# Patient Record
Sex: FEMALE | Race: WHITE
[De-identification: names, ages, dates, MRNs, and addresses within clinical notes are randomized per-mention and may not be internally consistent; named-entity substitution may affect disease eponyms.]

---

## 2018-06-12 ENCOUNTER — HOSPITAL ENCOUNTER (EMERGENCY)
Age: 73
Discharge: HOME | End: 2018-06-12
Payer: MEDICARE

## 2018-06-12 VITALS
SYSTOLIC BLOOD PRESSURE: 125 MMHG | OXYGEN SATURATION: 98 % | RESPIRATION RATE: 12 BRPM | HEART RATE: 59 BPM | DIASTOLIC BLOOD PRESSURE: 75 MMHG

## 2018-06-12 VITALS
RESPIRATION RATE: 20 BRPM | OXYGEN SATURATION: 100 % | SYSTOLIC BLOOD PRESSURE: 153 MMHG | TEMPERATURE: 97.7 F | DIASTOLIC BLOOD PRESSURE: 78 MMHG | HEART RATE: 68 BPM

## 2018-06-12 VITALS
SYSTOLIC BLOOD PRESSURE: 131 MMHG | HEART RATE: 65 BPM | DIASTOLIC BLOOD PRESSURE: 76 MMHG | RESPIRATION RATE: 13 BRPM | OXYGEN SATURATION: 95 %

## 2018-06-12 VITALS
SYSTOLIC BLOOD PRESSURE: 131 MMHG | DIASTOLIC BLOOD PRESSURE: 78 MMHG | HEART RATE: 64 BPM | RESPIRATION RATE: 15 BRPM | OXYGEN SATURATION: 100 %

## 2018-06-12 DIAGNOSIS — R07.89: Primary | ICD-10-CM

## 2018-06-12 LAB
ADD MANUAL DIFF / SLIDE REVIEW: NO
ALBUMIN SERPL-MCNC: 4.2 G/DL (ref 3.5–5)
ALBUMIN/GLOB SERPL: 1.5 {RATIO} (ref 1–2.8)
ALP SERPL-CCNC: 87 U/L (ref 38–126)
ALT SERPL-CCNC: 42 IU/L (ref 9–52)
BUN SERPL-MCNC: 17 MG/DL (ref 7–17)
CALCIUM SERPL-MCNC: 9.1 MG/DL (ref 8.4–10.2)
CHLORIDE SERPL-SCNC: 99 MMOL/L (ref 98–107)
CK SERPL-CCNC: 145 U/L (ref 30–135)
CKMB % RELATIVE INDEX: 2.1 % (ref 1.5–5)
CO2 SERPL-SCNC: 28 MMOL/L (ref 22–32)
ESTIMATED GLOMERULAR FILT RATE: > 60 ML/MIN (ref 60–?)
GLOBULIN SER CALC-MCNC: 2.8 G/DL (ref 1.7–4.1)
GLUCOSE SERPL-MCNC: 99 MG/DL (ref 80–110)
HEMATOCRIT: 38.4 % (ref 36–46)
HEMOGLOBIN: 12.9 G/DL (ref 12–16)
HEMOLYSIS: < 15 (ref 0–50)
LIPASE SERPL-CCNC: 156 U/L (ref 23–300)
MAGNESIUM SERPL-MCNC: 2 MG/DL (ref 1.6–2.3)
MCV RBC: 83.9 FL (ref 80–100)
MEAN CORPUSCULAR HEMOGLOBIN: 28.1 PG (ref 26–34)
MEAN CORPUSCULAR HGB CONC: 33.5 % (ref 30–36)
PLATELET COUNT: 268 X10^3/UL (ref 150–400)
POTASSIUM SERPL-SCNC: 4.3 MMOL/L (ref 3.4–5.1)
PROT SERPL-MCNC: 7 G/DL (ref 6.3–8.2)
SODIUM SERPL-SCNC: 135 MMOL/L (ref 137–145)
TROPONIN I SERPL-MCNC: < 0.012 NG/ML (ref 0.01–0.03)

## 2018-06-12 PROCEDURE — 72040 X-RAY EXAM NECK SPINE 2-3 VW: CPT

## 2018-06-12 PROCEDURE — 84484 ASSAY OF TROPONIN QUANT: CPT

## 2018-06-12 PROCEDURE — 99283 EMERGENCY DEPT VISIT LOW MDM: CPT

## 2018-06-12 PROCEDURE — 82553 CREATINE MB FRACTION: CPT

## 2018-06-12 PROCEDURE — 71046 X-RAY EXAM CHEST 2 VIEWS: CPT

## 2018-06-12 PROCEDURE — 82550 ASSAY OF CK (CPK): CPT

## 2018-06-12 PROCEDURE — 96360 HYDRATION IV INFUSION INIT: CPT

## 2018-06-12 PROCEDURE — 36591 DRAW BLOOD OFF VENOUS DEVICE: CPT

## 2018-06-12 PROCEDURE — 99285 EMERGENCY DEPT VISIT HI MDM: CPT

## 2018-06-12 PROCEDURE — 93005 ELECTROCARDIOGRAM TRACING: CPT

## 2018-06-12 PROCEDURE — 80053 COMPREHEN METABOLIC PANEL: CPT

## 2018-06-12 PROCEDURE — 85025 COMPLETE CBC W/AUTO DIFF WBC: CPT

## 2018-06-12 PROCEDURE — 83735 ASSAY OF MAGNESIUM: CPT

## 2018-06-12 PROCEDURE — 83690 ASSAY OF LIPASE: CPT
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
## 2018-07-06 ENCOUNTER — HOSPITAL ENCOUNTER (OUTPATIENT)
Age: 73
End: 2018-07-06
Payer: MEDICARE

## 2018-07-06 DIAGNOSIS — E78.00: Primary | ICD-10-CM

## 2018-07-06 LAB
ALT SERPL-CCNC: 39 IU/L (ref 9–52)
CHOLEST SERPL-MCNC: 194 MG/DL (ref 140–199)
HDLC SERPL-MCNC: 88 MG/DL (ref 40–60)
TRIGL SERPL-MCNC: 68 MG/DL (ref 35–150)

## 2018-07-06 PROCEDURE — 36415 COLL VENOUS BLD VENIPUNCTURE: CPT

## 2018-07-06 PROCEDURE — 80061 LIPID PANEL: CPT

## 2018-07-06 PROCEDURE — 84460 ALANINE AMINO (ALT) (SGPT): CPT

## 2018-07-06 PROCEDURE — 84450 TRANSFERASE (AST) (SGOT): CPT

## 2018-10-19 ENCOUNTER — HOSPITAL ENCOUNTER (OUTPATIENT)
Age: 73
End: 2018-10-19
Payer: MEDICARE

## 2018-10-19 DIAGNOSIS — R07.9: Primary | ICD-10-CM

## 2018-10-19 PROCEDURE — 93306 TTE W/DOPPLER COMPLETE: CPT

## 2018-10-19 NOTE — DI.ECHO.S_ITS
"  
Echocardiogram Report  
+-----------------------------------------------------------------------------+  
:Name: BONY JUARES Study Date: 10/19/2018 Height: 54 in :  
:[facility] MRN #: [id number] Weight: 128 lb :  
:Account #: [id number] Gender: Female BSA: 1.4 m2 :  
:: 1945 Age: 73 yrs BP: 132/70 mmHg:  
:Reason For Study: Chest pain :  
: Performed By: Padmini Gant :  
:Referring: ROSY GAGNON :  
+-----------------------------------------------------------------------------+  
Interpretation Summary  
The left ventricle is normal in size, wall thickness, and systolic function  
without any focal wall motion abnormalities.  
The ejection fraction is estimated to be 60-65%.  
The right ventricle grossly appears normal in size with probable normal  
systolic function.  
No significant valvular abnormalities.  
There is no prior echocardiogram for comparison.  
  
Procedure: A two-dimensional transthoracic echocardiogram with color flow  
and Doppler was performed. The study quality was technically adequate. There  
is no prior echocardiogram noted for this patient. The patient was in normal  
sinus rhythm during the exam.  
  
Left Ventricle: The left ventricle is normal in size, wall thickness, and  
systolic function without any focal wall motion abnormalities. The ejection  
fraction is estimated to be 60-65%. Left ventricular wall motion is normal.  
Diastolic parameters suggest probable normal left ventricular diastolic  
function and normal filling pressures.  
  
Right Ventricle: The right ventricle grossly appears normal in size with  
probable normal systolic function.  
  
Atria: The left atrial size is normal. Right atrial size is normal. The  
interatrial septum is intact with no evidence for an atrial septal defect.  
  
Mitral Valve: The mitral valve is grossly normal. There is no mitral valve  
stenosis. There is mild mitral regurgitation.  
  
Aortic Valve: The aortic valve opens well. The aortic valve is trileaflet.  
There is no aortic valve stenosis. No aortic regurgitation is present.  
  
Tricuspid Valve: The tricuspid valve is normal in structure and function.  
There is trace tricuspid regurgitation. Pulmonary artery pressures cannot be  
estimated because of the lack of a measurable TR jet velocity but the IVC  
suggests a CVP of around 3 mmHg.  
  
Pulmonic Valve: The pulmonic valve is not well visualized.  
  
Great Vessels: The aortic root is normal size. The dimensions of the  
ascending aorta are normal. The IVC is of normal diameter and collapses  
greater than 50% with a sniff. This suggests a low right atrial pressure of 3  
mm Hg.  
  
Pericardium/ Pleura There is no pericardial effusion. There is no pleural  
effusion.  
  
MMode/2D Measurements & Calculations  
LVIDd: 4.1 cm Ao root diam: 3.1 cm  
LVIDs: 2.9 cm Aortic Jxn: 2.7 cm  
FS: 29.7 % asc Aorta Diam: 3.2 cm  
EPSS: 0.00 cm Ao Arch Diam (Prox Trans): 2.5 cm  
IVSd: 0.71 cm  
LVPWd: 0.64 cm  
LV ji. diameter/BSA (cm/m^2): 2.9  
LV sys. diameter/BSA (cm/m^2): 2.0  
_______________________________________________________________  
LA dimension: 3.5 cm RA long axis: 4.5 cm  
LA A2 area: 19.5 cm2 RA area: 15.1 cm2  
LA A4 area: 18.4 cm2 RA vol: 42.8 ml  
LA length (vol): 5.0 cm RA VI: 29.9 ml/m2  
LA vol: 60.7 ml IVC diam: 1.9 cm  
LA vol index: 42.4 ml/m2 RVDd major: 4.3 cm  
_______________________________________________________________  
RVD1 (basal): 3.4 cm  
RVD2 (mid): 2.7 cm  
Doppler Measurements & Calculations  
Ao V2 max: 110.2 cm/sec MV E max tremaine: 98.7 cm/sec  
Ao V2 mean: 68.6 cm/sec MV A max tremaine: 65.2 cm/sec  
Ao max P.9 mmHg MV E/A: 1.5  
Ao mean P.3 mmHg Med Peak E' Tremaine: 8.6 cm/sec  
Ao V2 VTI: 25.9 cm E/E' med: 11.4  
Lat Peak E' Tremaine: 11.2 cm/sec  
E/E' lat: 8.8  
E/e' average: 10.1  
MV dec time: 0.17 sec  
MV P1/2t: 49.8 msec  
_______________________________________________________________  
TR max tremaine: 
[id number]|2018-10-15 13:56:00|2018-10-15 14:25:00|.S_ITS|[facility]|Imaging|[id number]|"PROCEDURE:  US OB FOLLOW UP

## 2018-10-20 ENCOUNTER — HOSPITAL ENCOUNTER (OUTPATIENT)
Age: 73
End: 2018-10-20
Payer: MEDICARE

## 2018-10-20 DIAGNOSIS — Z12.31: Primary | ICD-10-CM

## 2018-10-20 PROCEDURE — 77067 SCR MAMMO BI INCL CAD: CPT

## 2018-10-20 PROCEDURE — 77063 BREAST TOMOSYNTHESIS BI: CPT

## 2018-10-20 NOTE — DI.MG.S_ITS
BILATERAL DIGITAL SCREENING MAMMOGRAM 3D/2D WITH CAD: 10/20/2018  
   
CLINICAL: Routine screening.    
   
Comparison is made to exams dated:  10/12/2016 mammogram, 7/2/2015 mammogram, and   
6/30/2014 mammogram - [facility].  The tissue of both breasts is heterogeneously dense. This   
may lower the sensitivity of mammography.    
   
Current study was also evaluated with a Computer Aided Detection (CAD) system.    
No significant masses, calcifications, or other findings are seen in either breast.    
There has been no significant interval change.  
   
IMPRESSION: NEGATIVE  
There is no mammographic evidence of malignancy. A 1 year screening mammogram is   
recommended.     
   
This exam was interpreted at Station ID: [id number].    
   
NOTE: For mammograms, a report in lay terms will be sent to the patient. Approximately   
15% of breast malignancies will not be visualized mammographically. In the management of   
a palpable breast mass, a negative mammogram must not discourage biopsy of a clinically   
suspicious lesion.  
   
Electronically Signed By: La rosas/mik:10/23/2018 10:16:30    
   
   
letter sent: Normal Exam    
ACR BI-RADS Category 1: Negative 3341F

## 2019-07-08 ENCOUNTER — HOSPITAL ENCOUNTER (OUTPATIENT)
Age: 74
End: 2019-07-08
Payer: MEDICARE

## 2019-07-08 DIAGNOSIS — Z82.62: ICD-10-CM

## 2019-07-08 DIAGNOSIS — M85.88: Primary | ICD-10-CM

## 2019-07-08 PROCEDURE — 77080 DXA BONE DENSITY AXIAL: CPT

## 2019-10-24 ENCOUNTER — HOSPITAL ENCOUNTER (OUTPATIENT)
Age: 74
End: 2019-10-24
Payer: MEDICARE

## 2019-10-24 DIAGNOSIS — Z12.31: Primary | ICD-10-CM

## 2019-10-24 PROCEDURE — 77063 BREAST TOMOSYNTHESIS BI: CPT

## 2019-10-24 PROCEDURE — 77067 SCR MAMMO BI INCL CAD: CPT

## 2019-10-24 NOTE — DI.MG.S_ITS
BILATERAL DIGITAL SCREENING MAMMOGRAM 3D/2D WITH CAD: 10/24/2019  
   
CLINICAL: Routine screening.    
   
Comparison is made to exams dated:  10/20/2018 mammogram - [facility], 10/12/2016   
mammogram, and 7/2/2015 mammogram - [facility].  The tissue of both breasts is   
heterogeneously dense. This may lower the sensitivity of mammography.    
   
Current study was also evaluated with a Computer Aided Detection (CAD) system.    
No significant masses, calcifications, or other findings are seen in either breast.    
There has been no significant interval change.  
   
IMPRESSION: NEGATIVE  
There is no mammographic evidence of malignancy. A 1 year screening mammogram is   
recommended.     
   
This exam was interpreted at Station ID: [id number].    
   
NOTE: For mammograms, a report in lay terms will be sent to the patient. Approximately   
15% of breast malignancies will not be visualized mammographically. In the management of   
a palpable breast mass, a negative mammogram must not discourage biopsy of a clinically   
suspicious lesion.  
   
Electronically Signed By: Neto kumar/mik:10/24/2019 12:57:42    
   
   
letter sent: Normal Exam    
ACR BI-RADS Category 1: Negative 3341F

## 2020-07-08 ENCOUNTER — HOSPITAL ENCOUNTER (OUTPATIENT)
Age: 75
End: 2020-07-08
Payer: MEDICARE

## 2020-07-08 DIAGNOSIS — Z03.818: Primary | ICD-10-CM

## 2020-07-08 DIAGNOSIS — M81.0: ICD-10-CM

## 2020-07-08 DIAGNOSIS — E78.00: ICD-10-CM

## 2020-07-08 LAB
ADD MANUAL DIFF / SLIDE REVIEW: NO
ALBUMIN SERPL-MCNC: 4.2 G/DL (ref 3.5–5)
ALBUMIN/GLOB SERPL: 1.6 {RATIO} (ref 1–2.8)
ALP SERPL-CCNC: 91 U/L (ref 38–126)
ALT SERPL-CCNC: 33 IU/L (ref ?–35)
BUN SERPL-MCNC: 12 MG/DL (ref 7–17)
CALCIUM SERPL-MCNC: 9.1 MG/DL (ref 8.4–10.2)
CHLORIDE SERPL-SCNC: 105 MMOL/L (ref 98–107)
CHOLEST SERPL-MCNC: 194 MG/DL (ref 140–199)
CO2 SERPL-SCNC: 28 MMOL/L (ref 22–32)
ESTIMATED GLOMERULAR FILT RATE: > 60 ML/MIN (ref 60–?)
GLOBULIN SER CALC-MCNC: 2.7 G/DL (ref 1.7–4.1)
GLUCOSE SERPL-MCNC: 102 MG/DL (ref 80–110)
HDLC SERPL-MCNC: 75 MG/DL (ref 40–60)
HEMATOCRIT: 39.9 % (ref 36–46)
HEMOGLOBIN: 12.9 G/DL (ref 12–16)
HEMOLYSIS: < 15 (ref 0–50)
LYMPHOCYTES # SPEC AUTO: 1700 /UL (ref 1100–4500)
MCV RBC: 84.8 FL (ref 80–100)
MEAN CORPUSCULAR HEMOGLOBIN: 27.5 PG (ref 26–34)
MEAN CORPUSCULAR HGB CONC: 32.4 % (ref 30–36)
PLATELET COUNT: 269 X10^3/UL (ref 150–400)
POTASSIUM SERPL-SCNC: 4.5 MMOL/L (ref 3.4–5.1)
PROT SERPL-MCNC: 6.9 G/DL (ref 6.3–8.2)
SODIUM SERPL-SCNC: 138 MMOL/L (ref 137–145)
TRIGL SERPL-MCNC: 107 MG/DL (ref 35–150)

## 2020-07-08 PROCEDURE — 80053 COMPREHEN METABOLIC PANEL: CPT

## 2020-07-08 PROCEDURE — 86769 SARS-COV-2 COVID-19 ANTIBODY: CPT

## 2020-07-08 PROCEDURE — 85025 COMPLETE CBC W/AUTO DIFF WBC: CPT

## 2020-07-08 PROCEDURE — 36415 COLL VENOUS BLD VENIPUNCTURE: CPT

## 2020-07-08 PROCEDURE — 80061 LIPID PANEL: CPT

## 2020-09-27 ENCOUNTER — HOSPITAL ENCOUNTER (EMERGENCY)
Age: 75
Discharge: HOME | End: 2020-09-27
Payer: MEDICARE

## 2020-09-27 VITALS
OXYGEN SATURATION: 99 % | SYSTOLIC BLOOD PRESSURE: 141 MMHG | HEART RATE: 71 BPM | RESPIRATION RATE: 18 BRPM | TEMPERATURE: 96.8 F | DIASTOLIC BLOOD PRESSURE: 65 MMHG

## 2020-09-27 VITALS — HEART RATE: 72 BPM | DIASTOLIC BLOOD PRESSURE: 77 MMHG | SYSTOLIC BLOOD PRESSURE: 137 MMHG

## 2020-09-27 VITALS
DIASTOLIC BLOOD PRESSURE: 75 MMHG | RESPIRATION RATE: 14 BRPM | OXYGEN SATURATION: 98 % | HEART RATE: 67 BPM | SYSTOLIC BLOOD PRESSURE: 163 MMHG

## 2020-09-27 DIAGNOSIS — R11.0: ICD-10-CM

## 2020-09-27 DIAGNOSIS — M54.2: ICD-10-CM

## 2020-09-27 DIAGNOSIS — R42: Primary | ICD-10-CM

## 2020-09-27 LAB
ADD MANUAL DIFF / SLIDE REVIEW: NO
ALBUMIN SERPL-MCNC: 4.1 G/DL (ref 3.5–5)
ALBUMIN/GLOB SERPL: 1.5 {RATIO} (ref 1–2.8)
ALP SERPL-CCNC: 110 U/L (ref 38–126)
ALT SERPL-CCNC: 35 IU/L (ref ?–35)
BUN SERPL-MCNC: 16 MG/DL (ref 7–17)
CALCIUM SERPL-MCNC: 9.1 MG/DL (ref 8.4–10.2)
CHLORIDE SERPL-SCNC: 99 MMOL/L (ref 98–107)
CK SERPL-CCNC: 173 U/L (ref 30–135)
CKMB % RELATIVE INDEX: 2 % (ref 1.5–5)
CO2 SERPL-SCNC: 28 MMOL/L (ref 22–32)
ESTIMATED GLOMERULAR FILT RATE: > 60 ML/MIN (ref 60–?)
GLOBULIN SER CALC-MCNC: 2.7 G/DL (ref 1.7–4.1)
GLUCOSE SERPL-MCNC: 104 MG/DL (ref 80–110)
HEMATOCRIT: 37.5 % (ref 36–46)
HEMOGLOBIN: 12.8 G/DL (ref 12–16)
HEMOLYSIS: < 15 (ref 0–50)
LYMPHOCYTES # SPEC AUTO: 1800 /UL (ref 1100–4500)
MCV RBC: 83.1 FL (ref 80–100)
MEAN CORPUSCULAR HEMOGLOBIN: 28.3 PG (ref 26–34)
MEAN CORPUSCULAR HGB CONC: 34 % (ref 30–36)
PLATELET COUNT: 257 X10^3/UL (ref 150–400)
POTASSIUM SERPL-SCNC: 4.4 MMOL/L (ref 3.4–5.1)
PROT SERPL-MCNC: 6.8 G/DL (ref 6.3–8.2)
SODIUM SERPL-SCNC: 132 MMOL/L (ref 137–145)
TROPONIN I SERPL-MCNC: < 0.012 NG/ML (ref 0.01–0.03)

## 2020-09-27 PROCEDURE — 82550 ASSAY OF CK (CPK): CPT

## 2020-09-27 PROCEDURE — 82553 CREATINE MB FRACTION: CPT

## 2020-09-27 PROCEDURE — 80053 COMPREHEN METABOLIC PANEL: CPT

## 2020-09-27 PROCEDURE — 93005 ELECTROCARDIOGRAM TRACING: CPT

## 2020-09-27 PROCEDURE — 85025 COMPLETE CBC W/AUTO DIFF WBC: CPT

## 2020-09-27 PROCEDURE — 96360 HYDRATION IV INFUSION INIT: CPT

## 2020-09-27 PROCEDURE — 70450 CT HEAD/BRAIN W/O DYE: CPT

## 2020-09-27 PROCEDURE — 99284 EMERGENCY DEPT VISIT MOD MDM: CPT

## 2020-09-27 PROCEDURE — 84484 ASSAY OF TROPONIN QUANT: CPT

## 2020-09-27 NOTE — DI.CT.S_ITS
PROCEDURE:  CT HEAD/BRAIN WO CON  
   
INDICATIONS:  dizzy  
   
TECHNIQUE:    
Noncontrast 4.5 mm thick angled axial sections acquired from the foramen magnum to the   
vertex, with coronal and sagittal reformats.  For radiation dose reduction, the following   
was used:  automated exposure control, adjustment of mA and/or kV according to patient   
size.    
   
COMPARISON:  None.  
   
FINDINGS:    
Image quality:  Streak artifact from the patient's earrings can be seen.  
   
CSF spaces:  Basal cisterns are patent.  No extra-axial fluid collections.  The   
ventricles are symmetric in size and shape.    
   
Brain:  No intracranial bleeds or masses.  There is cerebral volume loss for age, with   
resultant ventricular and sulcal prominence.  There are periventricular and deep white   
matter chronic small vessel ischemic changes.  There is intracranial internal carotid   
artery atherosclerosis.    
   
Skull and face:  Calvarium and visualized facial bones appear intact, without suspicious   
lesions.    
   
Sinuses:  Visualized sinuses and mastoids are clear.    
   
IMPRESSION:    
   
No imaging explanation is found for this patient's presenting symptoms.    
   
If it would be helpful for clinical management decision making, please consider a   
dedicated brain MRI, IAC protocol (without and with contrast)  for further evaluation   
(assuming that there is no contraindication).    
   
   
Dictated by: Seun Servin M.D. on 9/27/2020 at 13:21       
Approved by: Seun Servin M.D. on 9/27/2020 at 13:23

## 2020-09-27 NOTE — ED.DIZZY
"HPI - Dizziness
General
Chief Complaint: Dizziness
Stated Complaint: Dizzy, Nausea, Stiff Neck
Time Seen by Provider: 09/27/20 13:26
Source: patient
Mode of arrival: Ambulatory
Limitations: no limitations
History of Present Illness
HPI Narrative: 75-year-old female who presents with dizziness off and on ongoing for the last 1-2 weeks.  She says she typically gets dizzy after she is walking around [facility].  She does not pass out head today she was having some neck pain 
and felt nauseated.  She has no numbness tingling or weakness.  She does not lose balance.  Overall is feeling better now he is in the ED
Related Data
Home Medications

 Medication  Instructions  Recorded  Confirmed
alendronate [Fosamax] 70 mg PO QWEEK #0 07/24/17 06/12/18
mometasone [Nasonex] 1 spray INTRANASAL AS DIRECTED #0 07/24/17 06/12/18
omeprazole magnesium [Prilosec OTC] 20 mg PO DAILY #0 07/24/17 06/12/18
simvastatin 10 mg PO HS #0 07/24/17 06/12/18
vitamins  A,C,E-zinc-copper 1 sgl PO DAILY #0 07/24/17 06/12/18
[PreserVision AREDS]   


Allergies

Allergy/AdvReac Type Severity Reaction Status Date / Time
No Known Drug Allergies Allergy   Verified 09/27/20 13:51



Review of Systems
Review of Systems
Narrative: GENERAL: Denies chills, fatigue, malaise, fever, sweats, travel
HEENT: Denies sinus pain, ear pain, sore throat, difficulty swallowing, neck pain
RESPIRATORY: Denies dyspnea, cough, wheezing, hemoptysis, sputum.
CARDIOVASCULAR: Denies chest pain, palpitations, orthopnea, edema 
GASTROINTESTINAL: Denies nausea, vomiting, abdominal pain, diarrhea, constipation, melena.
: Denies dysuria, frequency, incontinence, hematuria, urinary retention, flank pain.
MUSCULOSKELETAL: Denies weakness, joint pain, or bony pain
SKIN: No rash, no erythema, no pruritus
NEUROLOGIC:  See HPI
PSYCHIATRIC: No concerning psychosocial issues.

12 point review of systems is negative except for those stated above and HPI


Patient History
Medical History (Reviewed 09/27/20 @ 13:52 by Yuliet Robison DO)

GERD (gastroesophageal reflux disease) (Chronic)
Hyperlipidemia (Chronic)
Osteoporosis (Chronic)


Surgical History (Reviewed 09/27/20 @ 13:52 by Yuliet Robison DO)

H/O basal cell carcinoma excision (Chronic)
H/O melanoma excision (Chronic)
History of tonsillectomy (Resolved)


Family History (Reviewed 09/27/20 @ 13:52 by Yuliet Robison DO)
Father
 CAD (coronary artery disease)



Exam
Initial Vital Signs
Initial Vital Signs: Vital Signs

Temperature  96.8 F L  09/27/20 13:11
Pulse Rate  71   09/27/20 13:11
Respiratory Rate  18   09/27/20 13:11
Blood Pressure  141/65 H  09/27/20 13:11
Pulse Oximetry  99   09/27/20 13:11

GENERAL: Well-appearing, well-nourished and in no acute distress.
HEENT: Head atraumatic,EOMI, pupils reactive, face symmetric, moist mucous membranes 
CARDIOVASCULAR: Regular rate and rhythm without murmurs, rubs or gallops.
RESPIRATORY: Breath sounds equal bilaterally, no wheezes rales or rhonchi.
ABDOMEN: Soft, nontender.  Normoactive bowel sounds all 4 quadrants.  No guarding or rebound.
EXTREMITIES: Normal range of motion, no clubbing or edema.  Neurovascularly intact
NEUROLOGICAL: Alert and oriented x4.Normal gait and speech. Cranial nerves II through XII grossly intact.   Good finger-to-nose, good heel-to-shin, strength equal bilaterally, no dysarthria or aphasia, sensation in tact to soft touch bilaterally, no 
visual changes, no facial droop
SKIN: Warm, dry, no laceration, no petechiae, no rashes or lesions.

Scores
NIH Stroke Scale
Level of Conciousness: Alert, keenly responsive
Ask month/age: Answers both questions correctly.
Open/close eyes, close hand: Performs both tasks correctly
Best gaze horizontal: Normal
Visual fields: No visual loss
Facial palsy: Normal symetrical movement
Left arm drift: No drift for full 10 sec
Right arm drift: No drift for full 10 sec
Left leg drift: No drift for full 5 sec
Right leg drift: No drift for full 
[id number]|2020-09-27 13:42:00|2020-09-27 13:42:00|ED_ITS|[facility]|Emergency Department|[id number]|"HPI - Dizziness

## 2021-07-15 ENCOUNTER — HOSPITAL ENCOUNTER (OUTPATIENT)
Age: 76
End: 2021-07-15
Payer: MEDICARE

## 2021-07-15 DIAGNOSIS — M85.88: Primary | ICD-10-CM

## 2021-07-15 DIAGNOSIS — Z78.0: ICD-10-CM

## 2021-07-15 DIAGNOSIS — Z82.62: ICD-10-CM

## 2021-07-15 PROCEDURE — 77080 DXA BONE DENSITY AXIAL: CPT

## 2021-07-26 ENCOUNTER — HOSPITAL ENCOUNTER (OUTPATIENT)
Age: 76
End: 2021-07-26
Payer: MEDICARE

## 2021-07-26 DIAGNOSIS — R55: Primary | ICD-10-CM

## 2021-07-26 DIAGNOSIS — E04.1: ICD-10-CM

## 2021-07-26 PROCEDURE — 93880 EXTRACRANIAL BILAT STUDY: CPT

## 2021-08-02 ENCOUNTER — HOSPITAL ENCOUNTER (OUTPATIENT)
Age: 76
End: 2021-08-02
Payer: MEDICARE

## 2021-08-02 DIAGNOSIS — Z12.31: Primary | ICD-10-CM

## 2021-08-02 PROCEDURE — 77063 BREAST TOMOSYNTHESIS BI: CPT

## 2021-08-02 PROCEDURE — 77067 SCR MAMMO BI INCL CAD: CPT

## 2022-07-27 ENCOUNTER — HOSPITAL ENCOUNTER (OUTPATIENT)
Age: 77
End: 2022-07-27
Payer: MEDICARE

## 2022-07-27 DIAGNOSIS — E04.1: Primary | ICD-10-CM

## 2022-07-27 PROCEDURE — 76536 US EXAM OF HEAD AND NECK: CPT

## 2022-08-10 ENCOUNTER — HOSPITAL ENCOUNTER (OUTPATIENT)
Age: 77
End: 2022-08-10
Payer: MEDICARE

## 2022-08-10 DIAGNOSIS — E04.1: Primary | ICD-10-CM

## 2022-08-10 PROCEDURE — 10005 FNA BX W/US GDN 1ST LES: CPT

## 2023-07-10 ENCOUNTER — HOSPITAL ENCOUNTER (OUTPATIENT)
Age: 78
End: 2023-07-10
Payer: MEDICARE

## 2023-07-10 DIAGNOSIS — Z92.23: ICD-10-CM

## 2023-07-10 DIAGNOSIS — Z79.83: ICD-10-CM

## 2023-07-10 DIAGNOSIS — M81.0: ICD-10-CM

## 2023-07-10 DIAGNOSIS — Z78.0: Primary | ICD-10-CM

## 2023-07-10 PROCEDURE — 77080 DXA BONE DENSITY AXIAL: CPT

## 2023-07-14 ENCOUNTER — HOSPITAL ENCOUNTER (OUTPATIENT)
Age: 78
End: 2023-07-14
Payer: MEDICARE

## 2023-07-14 DIAGNOSIS — E78.2: ICD-10-CM

## 2023-07-14 DIAGNOSIS — E04.1: Primary | ICD-10-CM

## 2023-07-14 DIAGNOSIS — M81.0: ICD-10-CM

## 2023-07-14 DIAGNOSIS — E55.9: ICD-10-CM

## 2023-07-14 DIAGNOSIS — I25.84: ICD-10-CM

## 2023-07-14 DIAGNOSIS — I25.10: ICD-10-CM

## 2023-07-14 LAB
25(OH)D3+25(OH)D2 SERPL-MCNC: 42.8 NG/ML (ref 30–100)
ALBUMIN SERPL-MCNC: 4 G/DL (ref 3.5–5)
ALBUMIN/GLOB SERPL: 1.4 {RATIO} (ref 1–2.8)
ALP SERPL-CCNC: 103 U/L (ref 38–126)
ALT SERPL-CCNC: 34 IU/L (ref ?–35)
BUN SERPL-MCNC: 19 MG/DL (ref 7–17)
CALCIUM SERPL-MCNC: 8.6 MG/DL (ref 8.4–10.2)
CHLORIDE SERPL-SCNC: 103 MMOL/L (ref 98–107)
CHOLEST SERPL-MCNC: 203 MG/DL (ref 140–199)
CO2 SERPL-SCNC: 26 MMOL/L (ref 22–32)
ESTIMATED GLOMERULAR FILT RATE: > 60 ML/MIN (ref 60–?)
GLOBULIN SER CALC-MCNC: 2.8 G/DL (ref 1.7–4.1)
GLUCOSE SERPL-MCNC: 101 MG/DL (ref 80–110)
HDLC SERPL-MCNC: 91 MG/DL (ref 40–60)
HEMATOCRIT: 38 % (ref 36–46)
HEMOGLOBIN: 13 G/DL (ref 12–16)
HEMOLYSIS: 18 (ref 0–50)
MCV RBC: 84 FL (ref 80–100)
MEAN CORPUSCULAR HEMOGLOBIN: 28.6 PG (ref 26–34)
MEAN CORPUSCULAR HGB CONC: 34.1 % (ref 30–36)
PLATELET COUNT: 279 X10^3/UL (ref 150–400)
POTASSIUM SERPL-SCNC: 4.3 MMOL/L (ref 3.4–5.1)
PROT SERPL-MCNC: 6.8 G/DL (ref 6.3–8.2)
SODIUM SERPL-SCNC: 135 MMOL/L (ref 137–145)
TRIGL SERPL-MCNC: 215 MG/DL (ref 35–150)
TSH SERPL DL<=0.05 MIU/L-ACNC: 3.11 UIU/ML (ref 0.47–4.68)

## 2023-07-14 PROCEDURE — 84443 ASSAY THYROID STIM HORMONE: CPT

## 2023-07-14 PROCEDURE — 85027 COMPLETE CBC AUTOMATED: CPT

## 2023-07-14 PROCEDURE — 80053 COMPREHEN METABOLIC PANEL: CPT

## 2023-07-14 PROCEDURE — 36415 COLL VENOUS BLD VENIPUNCTURE: CPT

## 2023-07-14 PROCEDURE — 80061 LIPID PANEL: CPT

## 2023-07-14 PROCEDURE — 82306 VITAMIN D 25 HYDROXY: CPT

## 2023-08-28 ENCOUNTER — HOSPITAL ENCOUNTER (OUTPATIENT)
Age: 78
End: 2023-08-28
Payer: MEDICARE

## 2023-08-28 DIAGNOSIS — Z12.31: Primary | ICD-10-CM

## 2023-08-28 PROCEDURE — 77063 BREAST TOMOSYNTHESIS BI: CPT

## 2023-08-28 PROCEDURE — 77067 SCR MAMMO BI INCL CAD: CPT

## 2024-09-04 ENCOUNTER — HOSPITAL ENCOUNTER (OUTPATIENT)
Age: 79
End: 2024-09-04
Payer: MEDICARE

## 2024-09-04 DIAGNOSIS — R92.333: ICD-10-CM

## 2024-09-04 DIAGNOSIS — Z12.31: Primary | ICD-10-CM

## 2024-09-04 PROCEDURE — 77067 SCR MAMMO BI INCL CAD: CPT

## 2024-09-04 PROCEDURE — 77063 BREAST TOMOSYNTHESIS BI: CPT

## 2024-09-04 NOTE — DI.MG.S_ITS
BILATERAL DIGITAL SCREENING MAMMOGRAM 3D/2D WITH CAD: 9/4/2024 
  
CLINICAL: Routine screening.   
  
Comparison is made to exams dated:  8/28/2023 mammogram, 8/2/2021 mammogram, and  
10/24/2019 mammogram - [facility].   
  
Both breasts are heterogeneously dense, which may obscure small masses (category c /  
51-75% glandular tissue).   
  
Current study was also evaluated with a Computer Aided Detection (CAD) system.   
No significant masses, calcifications, or other findings are seen in either breast.   
There has been no significant interval change. 
  
IMPRESSION: NEGATIVE 
There is no mammographic evidence of malignancy. A 1 year screening mammogram is  
recommended.   
  
Based on the Tyrer Cuzick model (a risk assessment model) the patient's lifetime risk is  
3.6% and her 10 year risk is 0.0%. According to the ACR, ACS, and NCCN guidelines, an  
annual breast MRI exam along with mammogram is recommended if the patient's lifetime risk  
is 20% or greater. 
  
  
This exam was interpreted at Station ID: [id number].   
  
NOTE: For mammograms, a report in lay terms will be sent to the patient. Approximately  
15% of breast malignancies will not be visualized mammographically. In the management of  
a palpable breast mass, a negative mammogram must not discourage biopsy of a clinically  
suspicious lesion. 
  
Electronically Signed By: Lavinia Pacheco M.D., Ph.D.           
keith/mik:9/6/2024 00:46:49   
  
  
letter sent: Normal Exam   
ACR BI-RADS Category 1: Negative 3341F

## 2024-09-05 ENCOUNTER — HOSPITAL ENCOUNTER (OUTPATIENT)
Age: 79
End: 2024-09-05
Payer: MEDICARE

## 2024-09-05 DIAGNOSIS — I25.10: Primary | ICD-10-CM

## 2024-09-05 DIAGNOSIS — E78.2: ICD-10-CM

## 2024-09-05 DIAGNOSIS — M81.0: ICD-10-CM

## 2024-09-05 DIAGNOSIS — I25.84: ICD-10-CM

## 2024-09-05 LAB
ALBUMIN SERPL-MCNC: 3.9 G/DL (ref 3.5–5)
ALBUMIN/GLOB SERPL: 1.6 {RATIO} (ref 1–2.8)
ALP SERPL-CCNC: 104 U/L (ref 38–126)
ALT SERPL-CCNC: 28 IU/L (ref ?–35)
BUN SERPL-MCNC: 10 MG/DL (ref 7–17)
CALCIUM SERPL-MCNC: 8.9 MG/DL (ref 8.4–10.2)
CHLORIDE SERPL-SCNC: 102 MMOL/L (ref 98–107)
CHOLEST SERPL-MCNC: 213 MG/DL (ref 140–199)
CO2 SERPL-SCNC: 27 MMOL/L (ref 22–32)
ESTIMATED GLOMERULAR FILT RATE: > 60 ML/MIN (ref 60–?)
GLOBULIN SER CALC-MCNC: 2.5 G/DL (ref 1.7–4.1)
GLUCOSE SERPL-MCNC: 102 MG/DL (ref 80–110)
HDLC SERPL-MCNC: 99 MG/DL (ref 40–60)
HEMOLYSIS: < 15 (ref 0–50)
POTASSIUM SERPL-SCNC: 4.1 MMOL/L (ref 3.4–5.1)
PROT SERPL-MCNC: 6.4 G/DL (ref 6.3–8.2)
SODIUM SERPL-SCNC: 134 MMOL/L (ref 137–145)
TRIGL SERPL-MCNC: 118 MG/DL (ref 35–150)

## 2024-09-05 PROCEDURE — 80053 COMPREHEN METABOLIC PANEL: CPT

## 2024-09-05 PROCEDURE — 80061 LIPID PANEL: CPT

## 2024-09-05 PROCEDURE — 36415 COLL VENOUS BLD VENIPUNCTURE: CPT

## 2025-07-29 ENCOUNTER — HOSPITAL ENCOUNTER (OUTPATIENT)
Dept: HOSPITAL 73 - US | Age: 80
End: 2025-07-29
Payer: MEDICARE

## 2025-07-29 DIAGNOSIS — E04.1: Primary | ICD-10-CM

## 2025-07-29 PROCEDURE — 76536 US EXAM OF HEAD AND NECK: CPT
